# Patient Record
(demographics unavailable — no encounter records)

---

## 2025-07-16 NOTE — HISTORY OF PRESENT ILLNESS
[LMP unknown] : LMP unknown [postmenopausal] : postmenopausal [N] : Patient does not use contraception [Y] : Patient is sexually active [No] : Patient does not have concerns regarding sex [Currently Active] : currently active [Men] : men [TextBox_4] : The 59 year-old patient presents today for a routine GYN exam. She offers no complaints. We reviewed together in detail her past medical and surgical histories, allergies and medication usage, social and family history. All questions were answered in easy-to-understand language.  [Mammogramdate] : 01/25/2024 [BreastSonogramDate] : 01/25/2024 [PapSmeardate] : 01/25/2024 [BoneDensityDate] : 01/25/2024 [ColonoscopyDate] : NA [TextBox_78] : No history of HPV [PGHxTotal] : 5 [[facility] : 4

## 2025-07-16 NOTE — PLAN
[FreeTextEntry1] : I have spent 40 minutes of time on this encounter.  Greater than 50% of the face-to-face encounter time was spent on counseling and/or coordination of care for examination findings, differential, testing, management and planning. Ten minutes were allotted to review and discuss the depression screening. Yearly breast cancer screening with no current clinical or radiographic concerns.  The patient was reminded regarding future well breast and general healthcare, breast cancer risk reduction, the importance of self-examination and the need for follow up.   She was again reminded of the need to take Vit D3 2500  IU daily or to keep a Vit D level above 30, and Tumeric 1000 mg daily with Black Pepper.  Plan continued yearly imaging and breast follow up, sooner as needed.  I counseled the patient on current recommendations to reduce breast cancer risk including but not inclusive to regular exercise 20-30 minutes 3-4 times a week, low fat diet, limiting alcohol consumption, maintenance of ideal body weight, yearly imaging and self breast awareness.  Questions answered.  I encouraged in light of Covid 19, social distancing, frequent hand washing and precautions to stay healthy.  1)  Self breast exam instructions, calcium supplementation discussed with the patient. 2)  Mammography, lipid profile assessment, TSH screening, fasting glucose testing, colonoscopy screening was discussed with the patient. Vitamin D supplementation 3)  Maintain healthy weight. 4)  Regular health maintenance with PCP. 5)  Remain tobacco free. 6)  Limit alcohol intake to less than 5 drinks per week. 7)  Osteoporosis screening. 8)  Annual cholesterol screening. 9)  Annual influenza vaccine. The importance of routine physical activity was reviewed and a goal of 150 minutes of moderately vigorous exercise per week was endorsed.  Sleep Aides Discussed

## 2025-07-16 NOTE — PHYSICAL EXAM
[Appropriately responsive] : appropriately responsive [Alert] : alert [No Acute Distress] : no acute distress [No Lymphadenopathy] : no lymphadenopathy [Soft] : soft [Non-tender] : non-tender [Non-distended] : non-distended [No HSM] : No HSM [No Lesions] : no lesions [No Mass] : no mass [Oriented x3] : oriented x3 [Examination Of The Breasts] : a normal appearance [Breast Palpation Diffuse Fibrous Tissue Bilateral] : fibrocystic changes [No Masses] : no breast masses were palpable [Labia Majora] : normal [Labia Minora] : normal [Uterine Adnexae] : normal [Normal rectal exam] : was normal [Normal Brown Stool] : was normal and brown [Normal] : was normal [None] : there was no rectal mass  [FreeTextEntry6] : Well-healed reduction scars [Occult Blood Positive] : was negative for occult blood analysis [Internal Hemorrhoid] : no internal hemorrhoids were present [External Hemorrhoid] : no external hemorrhoids were present [Skin Tags] : no residual hemorrhoidal skin tags [FreeTextEntry8] : Normal examination